# Patient Record
Sex: FEMALE | Race: BLACK OR AFRICAN AMERICAN | NOT HISPANIC OR LATINO | Employment: FULL TIME | URBAN - METROPOLITAN AREA
[De-identification: names, ages, dates, MRNs, and addresses within clinical notes are randomized per-mention and may not be internally consistent; named-entity substitution may affect disease eponyms.]

---

## 2024-02-25 ENCOUNTER — OFFICE VISIT (OUTPATIENT)
Dept: URGENT CARE | Facility: URGENT CARE | Age: 24
End: 2024-02-25
Payer: COMMERCIAL

## 2024-02-25 VITALS
SYSTOLIC BLOOD PRESSURE: 103 MMHG | DIASTOLIC BLOOD PRESSURE: 73 MMHG | HEART RATE: 92 BPM | TEMPERATURE: 97.9 F | RESPIRATION RATE: 10 BRPM | WEIGHT: 139.2 LBS | OXYGEN SATURATION: 97 %

## 2024-02-25 DIAGNOSIS — R07.0 THROAT PAIN: Primary | ICD-10-CM

## 2024-02-25 LAB
DEPRECATED S PYO AG THROAT QL EIA: NEGATIVE
GROUP A STREP BY PCR: NOT DETECTED

## 2024-02-25 PROCEDURE — 99203 OFFICE O/P NEW LOW 30 MIN: CPT | Performed by: FAMILY MEDICINE

## 2024-02-25 PROCEDURE — 87651 STREP A DNA AMP PROBE: CPT | Performed by: FAMILY MEDICINE

## 2024-02-25 RX ORDER — PREDNISONE 20 MG/1
40 TABLET ORAL ONCE
Qty: 2 TABLET | Refills: 0 | Status: SHIPPED | OUTPATIENT
Start: 2024-02-25 | End: 2024-02-25

## 2024-02-25 NOTE — PROGRESS NOTES
SUBJECTIVE: 23 year old female with:  Chief Complaint   Patient presents with    Urgent Care    Throat Problem     Throat pain x's 3 days    ST  Duration: 3 days.  Severity: mild/moderate  Modifiers: OTC meds not helpful  Trend of symptoms: worsening  Other symptoms:    No Nausea/vomiting. No rash.  HEENT, CV, Resp, GI, Skin, review of symptoms except as noted above is otherwise negative  Allergies: Patient has no known allergies.  I have reviewed the patient's medical history in detail; there are no changes to the history as noted in [employer].    OBJECTIVE:  /73 (BP Location: Right arm, Patient Position: Sitting, Cuff Size: Adult Regular)   Pulse 92   Temp 97.9  F (36.6  C) (Oral)   Resp 10   Wt 63.1 kg (139 lb 3.2 oz)   LMP  (LMP Unknown)   SpO2 97%   Appears mild distress.  Eyes: no conjunctivitis  Ears: normal bilateral TM's and external ear canals, normal  Oropharynx: erythema, palate petechia  Neck: no adenopathy  Skin: no rash  Lungs: chest clear, no wheezing, rales, normal symmetric air entry, clear to IPPA  Rapid Strep test is:neg      ASSESSMENT/PLAN:    ICD-10-CM    1. Throat pain  R07.0 Streptococcus A Rapid Screen w/Reflex to PCR - Clinic Collect     Group A Streptococcus PCR Throat Swab     predniSONE (DELTASONE) 20 MG tablet        Patient Instructions     Fill and take prednisone if you get a to a point where you can't swallow    Else it is best to wait because it migt make the whole thing last a bit longer    We will call if the final strep test is positive      Gerald Brito MD [facility]

## 2024-02-25 NOTE — PATIENT INSTRUCTIONS
Fill and take prednisone if you get a to a point where you can't swallow    Else it is best to wait because it migt make the whole thing last a bit longer    We will call if the final strep test is positive

## 2024-02-27 ENCOUNTER — HOSPITAL ENCOUNTER (EMERGENCY)
Facility: CLINIC | Age: 24
Discharge: HOME | End: 2024-02-28
Attending: EMERGENCY MEDICINE | Admitting: EMERGENCY MEDICINE
Payer: COMMERCIAL

## 2024-02-27 ENCOUNTER — APPOINTMENT (OUTPATIENT)
Dept: CT IMAGING | Facility: CLINIC | Age: 24
End: 2024-02-27
Attending: EMERGENCY MEDICINE
Payer: COMMERCIAL

## 2024-02-27 VITALS
RESPIRATION RATE: 18 BRPM | SYSTOLIC BLOOD PRESSURE: 115 MMHG | DIASTOLIC BLOOD PRESSURE: 82 MMHG | HEART RATE: 96 BPM | TEMPERATURE: 99.5 F | OXYGEN SATURATION: 99 %

## 2024-02-27 DIAGNOSIS — J10.1 INFLUENZA B: ICD-10-CM

## 2024-02-27 DIAGNOSIS — J02.0 STREP PHARYNGITIS: ICD-10-CM

## 2024-02-27 LAB
ALBUMIN SERPL BCG-MCNC: 4.1 G/DL (ref 3.5–5.2)
ALP SERPL-CCNC: 68 U/L (ref 40–150)
ALT SERPL W P-5'-P-CCNC: <5 U/L (ref 0–50)
ANION GAP SERPL CALCULATED.3IONS-SCNC: 11 MMOL/L (ref 7–15)
AST SERPL W P-5'-P-CCNC: 19 U/L (ref 0–45)
BASOPHILS # BLD AUTO: 0 10E3/UL (ref 0–0.2)
BASOPHILS NFR BLD AUTO: 0 %
BILIRUB SERPL-MCNC: 0.6 MG/DL
BUN SERPL-MCNC: 6.9 MG/DL (ref 6–20)
CALCIUM SERPL-MCNC: 9 MG/DL (ref 8.6–10)
CHLORIDE SERPL-SCNC: 100 MMOL/L (ref 98–107)
CREAT SERPL-MCNC: 0.76 MG/DL (ref 0.51–0.95)
CRP SERPL-MCNC: 56.9 MG/L
DEPRECATED HCO3 PLAS-SCNC: 22 MMOL/L (ref 22–29)
EGFRCR SERPLBLD CKD-EPI 2021: >90 ML/MIN/1.73M2
EOSINOPHIL # BLD AUTO: 0 10E3/UL (ref 0–0.7)
EOSINOPHIL NFR BLD AUTO: 0 %
ERYTHROCYTE [DISTWIDTH] IN BLOOD BY AUTOMATED COUNT: 16.4 % (ref 10–15)
FLUAV RNA SPEC QL NAA+PROBE: NEGATIVE
FLUBV RNA RESP QL NAA+PROBE: POSITIVE
GLUCOSE SERPL-MCNC: 89 MG/DL (ref 70–99)
GROUP A STREP BY PCR: DETECTED
HCG UR QL: NEGATIVE
HCT VFR BLD AUTO: 33 % (ref 35–47)
HGB BLD-MCNC: 10.3 G/DL (ref 11.7–15.7)
IMM GRANULOCYTES # BLD: 0.1 10E3/UL
IMM GRANULOCYTES NFR BLD: 1 %
INTERNAL QC OK POCT: NORMAL
LYMPHOCYTES # BLD AUTO: 1 10E3/UL (ref 0.8–5.3)
LYMPHOCYTES NFR BLD AUTO: 11 %
MCH RBC QN AUTO: 23.1 PG (ref 26.5–33)
MCHC RBC AUTO-ENTMCNC: 31.2 G/DL (ref 31.5–36.5)
MCV RBC AUTO: 74 FL (ref 78–100)
MONOCYTES # BLD AUTO: 1.1 10E3/UL (ref 0–1.3)
MONOCYTES NFR BLD AUTO: 12 %
NEUTROPHILS # BLD AUTO: 6.8 10E3/UL (ref 1.6–8.3)
NEUTROPHILS NFR BLD AUTO: 76 %
NRBC # BLD AUTO: 0 10E3/UL
NRBC BLD AUTO-RTO: 0 /100
PLATELET # BLD AUTO: 302 10E3/UL (ref 150–450)
POCT KIT EXPIRATION DATE: NORMAL
POCT KIT LOT NUMBER: NORMAL
POTASSIUM SERPL-SCNC: 3.7 MMOL/L (ref 3.4–5.3)
PROT SERPL-MCNC: 8.1 G/DL (ref 6.4–8.3)
RBC # BLD AUTO: 4.46 10E6/UL (ref 3.8–5.2)
RSV RNA SPEC NAA+PROBE: NEGATIVE
SARS-COV-2 RNA RESP QL NAA+PROBE: NEGATIVE
SODIUM SERPL-SCNC: 133 MMOL/L (ref 135–145)
WBC # BLD AUTO: 8.9 10E3/UL (ref 4–11)

## 2024-02-27 PROCEDURE — 86140 C-REACTIVE PROTEIN: CPT | Performed by: EMERGENCY MEDICINE

## 2024-02-27 PROCEDURE — 81025 URINE PREGNANCY TEST: CPT | Performed by: EMERGENCY MEDICINE

## 2024-02-27 PROCEDURE — 99285 EMERGENCY DEPT VISIT HI MDM: CPT | Mod: 25 | Performed by: EMERGENCY MEDICINE

## 2024-02-27 PROCEDURE — 87651 STREP A DNA AMP PROBE: CPT | Performed by: EMERGENCY MEDICINE

## 2024-02-27 PROCEDURE — 70491 CT SOFT TISSUE NECK W/DYE: CPT | Mod: 26 | Performed by: RADIOLOGY

## 2024-02-27 PROCEDURE — 70491 CT SOFT TISSUE NECK W/DYE: CPT

## 2024-02-27 PROCEDURE — 250N000013 HC RX MED GY IP 250 OP 250 PS 637: Performed by: EMERGENCY MEDICINE

## 2024-02-27 PROCEDURE — 99284 EMERGENCY DEPT VISIT MOD MDM: CPT | Performed by: EMERGENCY MEDICINE

## 2024-02-27 PROCEDURE — 258N000003 HC RX IP 258 OP 636: Performed by: EMERGENCY MEDICINE

## 2024-02-27 PROCEDURE — 85025 COMPLETE CBC W/AUTO DIFF WBC: CPT | Performed by: EMERGENCY MEDICINE

## 2024-02-27 PROCEDURE — 96361 HYDRATE IV INFUSION ADD-ON: CPT | Performed by: EMERGENCY MEDICINE

## 2024-02-27 PROCEDURE — 87637 SARSCOV2&INF A&B&RSV AMP PRB: CPT | Performed by: EMERGENCY MEDICINE

## 2024-02-27 PROCEDURE — 80053 COMPREHEN METABOLIC PANEL: CPT | Performed by: EMERGENCY MEDICINE

## 2024-02-27 PROCEDURE — 36415 COLL VENOUS BLD VENIPUNCTURE: CPT | Performed by: EMERGENCY MEDICINE

## 2024-02-27 RX ORDER — IBUPROFEN 600 MG/1
600 TABLET, FILM COATED ORAL ONCE
Status: COMPLETED | OUTPATIENT
Start: 2024-02-27 | End: 2024-02-27

## 2024-02-27 RX ORDER — PENICILLIN V POTASSIUM 500 MG/1
500 TABLET, FILM COATED ORAL ONCE
Status: COMPLETED | OUTPATIENT
Start: 2024-02-27 | End: 2024-02-28

## 2024-02-27 RX ORDER — IOPAMIDOL 755 MG/ML
90 INJECTION, SOLUTION INTRAVASCULAR ONCE
Status: COMPLETED | OUTPATIENT
Start: 2024-02-27 | End: 2024-02-28

## 2024-02-27 RX ADMIN — IBUPROFEN 600 MG: 600 TABLET, FILM COATED ORAL at 21:53

## 2024-02-27 RX ADMIN — SODIUM CHLORIDE 1000 ML: 9 INJECTION, SOLUTION INTRAVENOUS at 21:54

## 2024-02-27 ASSESSMENT — COLUMBIA-SUICIDE SEVERITY RATING SCALE - C-SSRS
6. HAVE YOU EVER DONE ANYTHING, STARTED TO DO ANYTHING, OR PREPARED TO DO ANYTHING TO END YOUR LIFE?: NO
1. IN THE PAST MONTH, HAVE YOU WISHED YOU WERE DEAD OR WISHED YOU COULD GO TO SLEEP AND NOT WAKE UP?: NO
2. HAVE YOU ACTUALLY HAD ANY THOUGHTS OF KILLING YOURSELF IN THE PAST MONTH?: NO

## 2024-02-27 ASSESSMENT — ACTIVITIES OF DAILY LIVING (ADL)
ADLS_ACUITY_SCORE: 35
ADLS_ACUITY_SCORE: 35

## 2024-02-28 PROCEDURE — 99207 PR NO CHARGE LOS: CPT | Performed by: OTOLARYNGOLOGY

## 2024-02-28 PROCEDURE — 250N000013 HC RX MED GY IP 250 OP 250 PS 637: Performed by: EMERGENCY MEDICINE

## 2024-02-28 PROCEDURE — 250N000011 HC RX IP 250 OP 636: Performed by: EMERGENCY MEDICINE

## 2024-02-28 PROCEDURE — 42700 I&D ABSCESS PERITONSILLAR: CPT

## 2024-02-28 PROCEDURE — 87070 CULTURE OTHR SPECIMN AEROBIC: CPT

## 2024-02-28 PROCEDURE — 250N000009 HC RX 250: Performed by: EMERGENCY MEDICINE

## 2024-02-28 PROCEDURE — 96374 THER/PROPH/DIAG INJ IV PUSH: CPT | Mod: 59 | Performed by: EMERGENCY MEDICINE

## 2024-02-28 RX ORDER — LIDOCAINE HYDROCHLORIDE 20 MG/ML
10 SOLUTION OROPHARYNGEAL ONCE
Status: DISCONTINUED | OUTPATIENT
Start: 2024-02-28 | End: 2024-02-28 | Stop reason: HOSPADM

## 2024-02-28 RX ORDER — ACETAMINOPHEN 500 MG
1000 TABLET ORAL ONCE
Status: COMPLETED | OUTPATIENT
Start: 2024-02-28 | End: 2024-02-28

## 2024-02-28 RX ORDER — LIDOCAINE HYDROCHLORIDE AND EPINEPHRINE 10; 10 MG/ML; UG/ML
INJECTION, SOLUTION INFILTRATION; PERINEURAL
Status: DISCONTINUED
Start: 2024-02-28 | End: 2024-02-28 | Stop reason: HOSPADM

## 2024-02-28 RX ORDER — PENICILLIN V POTASSIUM 500 MG/1
500 TABLET, FILM COATED ORAL 2 TIMES DAILY
Qty: 20 TABLET | Refills: 0 | Status: SHIPPED | OUTPATIENT
Start: 2024-02-28 | End: 2024-02-28

## 2024-02-28 RX ORDER — DEXAMETHASONE SODIUM PHOSPHATE 10 MG/ML
10 INJECTION, SOLUTION INTRAMUSCULAR; INTRAVENOUS ONCE
Status: COMPLETED | OUTPATIENT
Start: 2024-02-28 | End: 2024-02-28

## 2024-02-28 RX ADMIN — PENICILLIN V POTASSIUM 500 MG: 500 TABLET, FILM COATED ORAL at 00:50

## 2024-02-28 RX ADMIN — ACETAMINOPHEN 1000 MG: 500 TABLET ORAL at 04:40

## 2024-02-28 RX ADMIN — SODIUM CHLORIDE, PRESERVATIVE FREE 60 ML: 5 INJECTION INTRAVENOUS at 00:04

## 2024-02-28 RX ADMIN — DEXAMETHASONE SODIUM PHOSPHATE 10 MG: 10 INJECTION INTRAMUSCULAR; INTRAVENOUS at 04:44

## 2024-02-28 RX ADMIN — IOPAMIDOL 90 ML: 755 INJECTION, SOLUTION INTRAVENOUS at 00:05

## 2024-02-28 ASSESSMENT — ACTIVITIES OF DAILY LIVING (ADL)
ADLS_ACUITY_SCORE: 35

## 2024-02-28 NOTE — DISCHARGE INSTRUCTIONS
Warm salt water gargles can help with sore throat.  Take the antibiotics as prescribed until completion.  Even if your symptoms resolve, continue taking.  You received your first dose in the emergency department today.  Return to the emergency department if you are having difficulty breathing, inability to swallow small sips of fluid, increasing pain or fevers, or if you have concerns.    Your bloodwork also showed some anemia (low blood counts).  You can discuss with your primary care doctor starting iron supplements.

## 2024-02-28 NOTE — ED TRIAGE NOTES
Presents with sore throat since Saturday.   Strep negative on Sunday.   Prednisone taper not helping.   Left sided tonsil pushing uvula over.      Triage Assessment (Adult)       Row Name 02/27/24 8784          Triage Assessment    Airway WDL X  left sided tonsil pushing uvula over        Respiratory WDL    Respiratory WDL WDL        Skin Circulation/Temperature WDL    Skin Circulation/Temperature WDL WDL

## 2024-02-28 NOTE — CONSULTS
Otolaryngology Consult Note  February 28, 2024      CC: Tonsillar abscess    HPI: Yelena Earl is a 23 year old female without contributory past medical history who presents with a few days of left greater than right throat pain and increasing difficulty to tolerate p.o.  She feels like her voice is changed slightly.  She has not had any breathing symptoms.  She has felt feverish at home.  She went to urgent care on the 25th where a strep swab was negative.  She was prescribed prednisone at that time.  Today she is positive for strep and flu.  No major past medical or surgical history.  She endorses heavy menses but has never needed iron supplementation or blood transfusion.  She has never had an abscess in her mouth that required draining in the past.  She has not had any antibiotics before today.    History reviewed. No pertinent past medical history.    History reviewed. No pertinent surgical history.    Current Outpatient Medications   Medication Sig Dispense Refill    penicillin V (VEETID) 500 MG tablet Take 1 tablet (500 mg) by mouth 2 times daily for 10 days 20 tablet 0        No Known Allergies    Social History     Socioeconomic History    Marital status: Single     Spouse name: Not on file    Number of children: Not on file    Years of education: Not on file    Highest education level: Not on file   Occupational History    Not on file   Tobacco Use    Smoking status: Never    Smokeless tobacco: Never   Substance and Sexual Activity    Alcohol use: Not on file    Drug use: Not on file    Sexual activity: Not on file   Other Topics Concern    Not on file   Social History Narrative    Not on file     Social Determinants of Health     Financial Resource Strain: Not on file   Food Insecurity: Not on file   Transportation Needs: Not on file   Physical Activity: Not on file   Stress: Not on file   Social Connections: Not on file   Interpersonal Safety: Not on file   Housing Stability: Not on file       History  reviewed. No pertinent family history.    ROS: 12 point review of systems is negative unless noted in HPI.    PHYSICAL EXAM:  /82   Pulse 96   Temp 99.5  F (37.5  C)   Resp 18   LMP 02/27/2024   SpO2 99%   General: laying in bed, no acute distress  HEAD: normocephalic, atraumatic  Face: symmetrical, CN VII intact bilaterally (HB 1), no swelling, edema, or erythema  Eyes: clear sclera  Ears: External ears normal  Nose: no anterior drainage   Mouth: No trismus, moist, tongue piercing present  Oropharynx: Tonsillar erythema, swelling of the tonsils left greater than right with mild effacement of the left anterior pillar  Neck: no LAD, trachea midline  Neuro: cranial nerves 2-12 grossly intact  Respiratory: breathing non-labored on RA, no stridor  Skin: no rashes or skin lesions of the face/neck  Psych: pleasant affect  Cardio: extremities warm and well perfused     Incision and drainage: Due to tonsillar abscess, incision and drainage was indicated.  After obtaining verbal consent the oropharynx was anesthetized with benzocaine, lidocaine.  An 18-gauge needle was inserted into the area of the abscess and purulence was aspirated and sent for culture.  Next an 11 blade was used to make an incision along the anterior pillar.  A curved Renken was used to bluntly dissect into the abscess pocket.  A few cc of purulence were expressed.  The patient tolerated the procedure very well.      ROUTINE IP LABS (Last four results)  BMP  Recent Labs   Lab 02/27/24  2148   *   POTASSIUM 3.7   CHLORIDE 100   BARON 9.0   CO2 22   BUN 6.9   CR 0.76   GLC 89     CBC  Recent Labs   Lab 02/27/24  2148   WBC 8.9   RBC 4.46   HGB 10.3*   HCT 33.0*   MCV 74*   MCH 23.1*   MCHC 31.2*   RDW 16.4*        INRNo lab results found in last 7 days.    Imaging:  Results for orders placed or performed during the hospital encounter of 02/27/24   Soft tissue neck CT w contrast    Narrative    EXAM: CT SOFT TISSUE NECK W  CONTRAST  LOCATION: [facility]  DATE: 2/28/2024    INDICATION: difficulty rotating neck, pharyngitis, Left tonsil > right tonsil  COMPARISON: None.  CONTRAST: iopamidol (ISOVUE 370) solution 90 mL  TECHNIQUE: Routine CT Soft Tissue Neck with IV contrast. Multiplanar reformats. Dose reduction techniques were used.    FINDINGS:     MUCOSAL SPACES/SOFT TISSUES: There is prominence of the left tonsillar pillar with an early abscess formation with rim enhancement. This measures approximately 1.7 cm anteriorly posteriorly by 1.8 cm transversely by 2.2 cm craniocaudally. There is also   prominence of the lingual tonsils but no discrete abscess formation within. The rest of the mucosal surfaces of the upper aerodigestive tract are within normal limits. Normal vocal cords and infraglottic trachea. There is inflammatory stranding noted   within the left parapharyngeal fat space. Normal oral cavity,  spaces, and floor of mouth structures.    LYMPH NODES: There are scattered lymph nodes seen throughout the neck region especially at the level 1 through level 3 juan a stations. The largest is a left jugulodigastric lymph node that measures approximately 1.5 mm x 1.4 mm.     SALIVARY GLANDS: Normal parotid and submandibular glands.    THYROID: Normal.     VESSELS: Vascular structures of the neck are patent.    VISUALIZED INTRACRANIAL/ORBITS/SINUSES: No abnormality of the visualized intracranial compartment or orbits. Mucous retention cyst and mucosal thickening left maxillary sinus.    OTHER: No destructive osseous lesion. The included lung apices are clear. The patient has a tongue and nasal piercing.      Impression    IMPRESSION:   1.  Prominence of lingual tonsils and left tonsillar pillar with abscess within the left tonsillar pillar.  2.  Reactive adenopathy.         Assessment and Plan  Yelena Earl is a 23 year old female without contributory past medical history who  presents with a left-sided tonsillar abscess in the setting of group A strep and influenza B now status post incision and drainage.     -She is counseled to return to the ED if she has persistent bleeding, recurrence of the symptoms that brought her here  -Augmentin for 10 days  -Follow-up with ENT if this recurs a second time    Patient and plan to be discussed with staff surgeon Dr. Kate Lerner MD  Otolaryngology-Head & Neck Surgery, PGY2  Please page ENT with questions by dialing [phone] and entering job code [phone] when prompted.    I, Kate Quiroz MD, discussed this patient with the resident/fellow at the time of consultation. I have reviewed the note, labs, imaging and am in agreement with the assessment and plan. I did not see the patient.

## 2024-02-28 NOTE — ED PROVIDER NOTES
[place] EMERGENCY DEPARTMENT ([facility])    2/27/24       ED PROVIDER NOTE      History     Chief Complaint   Patient presents with    Pharyngitis     The history is provided by the patient and medical records.     Yelena Earl is a 23 year old female who presents to the ED for evaluation of ongoing throat pain.  Patient states that she initially developed this pain last Saturday and had tested negative for strep throat on Sunday.  However, she is continuing to experience pain and has since developed a cough productive for phlegm as of today.  Patient is able to range of motion her neck, however states difficulty due to pain.  She thinks that there are voice changes.  She was able to have some soup today, but very little fluids in addition to this.  Notes that she has been taking 1 g Tylenol, her last dose was 2 hours ago.  She denies additional symptoms.  No nausea or vomiting.  Denies fevers or chills, chest pain, shortness of breath.  She denies any drooling, nausea, vomiting        Past Medical History  History reviewed. No pertinent past medical history.  History reviewed. No pertinent surgical history.  No current outpatient medications on file.    No Known Allergies  Family History  History reviewed. No pertinent family history.  Social History   Social History     Tobacco Use    Smoking status: Never    Smokeless tobacco: Never      Past medical history, past surgical history, medications, allergies, family history, and social history were reviewed with the patient. No additional pertinent items.      A medically appropriate review of systems was performed with pertinent positives and negatives noted in the HPI, and all other systems negative.    Physical Exam   BP: 115/82  Pulse: 96  Temp: 99.5  F (37.5  C)  Resp: 18  SpO2: 99 %  Physical Exam  General: no acute distress.  No stridor, trismus, sniffing position, drooling  HENT: Normocephalic and atraumatic. Trachea midline.  Tender anterior  cervical lymphadenopathy.  Bilateral tonsillar edema L>R with mild uvula deviation.  Slight hot potato voice.  No woodiness, crepitus, or difficulty with ROM.  Eyes: EOMI, conjunctivae normal.    Cardiovascular:  Normal rate and regular rhythm.   No murmur heard.  Radial pulses 2+ bilaterally.  Pulmonary:  No respiratory distress. Normal breath sounds bilaterally.  Abdominal: no distension.  Abdomen is soft. There is no mass. There is no abdominal tenderness.  Musculoskeletal:    Moving all extremities spontaneously.    Skin: Warm, dry, and well perfused.   Neurological:  No focal deficit present.    Psychiatric:   The patient is awake, alert.  Appropriate mood and affect.    ED Course, Procedures, & Data    9:34 PM  The patient was seen and examined by Amita Ceja   in Room VTA.     Procedures               No results found for any visits on 02/27/24.  Medications - No data to display  Labs Ordered and Resulted from Time of ED Arrival to Time of ED Departure - No data to display  No orders to display          Critical care was not performed.     Medical Decision Making  The patient's presentation was of high complexity (an acute health issue posing potential threat to life or bodily function).  The patient's evaluation involved:  review of external note(s) from 3+ sources (see separate area of note for details)  review of 3+ test result(s) ordered prior to this encounter (see separate area of note for details)  ordering and/or review of 3+ test(s) in this encounter (see separate area of note for details)  independent interpretation of testing performed by another health professional (see separate area of note for details)     The patient's management necessitated further care after sign-out to Dr. Larkin    Assessment & Plan    Patient arrives emergency department for chief complaint of pharyngitis, cough, decreased p.o. intake.  Physical exam is reassuring.  She is not tachycardic or febrile.  There is no  concern for acute airway compromise.  She has slightly muffled voice, otherwise tolerating her secretions.  Lungs are clear to auscultation bilaterally    Provided 1 L normal saline, 600 mg of ibuprofen.  Influenza B and strep PCR positive.  She is mildly hyponatremic 133.  CRP is 56.9.  There is no leukocytosis.  CBC reveals microcytic anemia with a hemoglobin of 10.3.  Patient given first dose of penicillin in the ED.  CT soft tissue to evaluate for PTA, retroperitoneal abscess, deep space infection, phlegmon.  Patient care signed out to oncoming physician to follow up on CT soft tissue neck.  Likely discharge on penicillin, and patient can follow up with primary care doctor to discuss iron supplementation.    I have reviewed the nursing notes. I have reviewed the findings, diagnosis, plan and need for follow up with the patient.    New Prescriptions    No medications on file       Final diagnoses:   None     [facility] EMERGENCY DEPARTMENT  2/27/2024         Amita Ceja MD  02/28/24 0142

## 2024-02-28 NOTE — ED PROVIDER NOTES
Sign Out Provider: Dr. Ceja    Sign Out Plan: 23-year-old female here for evaluation of worsening throat pain.  Patient tested positive for influenza B, positive for strep.  Patient was treated with dose of ibuprofen, penicillin and is ending CT scan to further evaluate and rule out abscess at the time of signout.    Reassessment: I personally reviewed and interpreted CT scan of the neck which demonstrates left tonsillar pillar with early abscess formation with rim enhancement 1.7 cm x 1.8 cm x 2.3 cm.  Discussed patient manage with ENT who evaluated patient once per night attempted bedside drainage which was unsuccessful.  Recommend continue supportive care, 10-day course of Augmentin, follow-up with ENT if worsening of symptoms.  Patient understands and agrees with plan.    Disposition: Discharge       Danielle Larkin MD  02/28/24 0619

## 2024-03-01 LAB
BACTERIA ABSC ANAEROBE+AEROBE CULT: ABNORMAL
GRAM STAIN RESULT: ABNORMAL

## 2024-04-11 ENCOUNTER — OFFICE VISIT (OUTPATIENT)
Dept: URGENT CARE | Facility: URGENT CARE | Age: 24
End: 2024-04-11
Payer: COMMERCIAL

## 2024-04-11 VITALS
OXYGEN SATURATION: 99 % | TEMPERATURE: 99.7 F | DIASTOLIC BLOOD PRESSURE: 75 MMHG | HEART RATE: 98 BPM | WEIGHT: 137 LBS | SYSTOLIC BLOOD PRESSURE: 114 MMHG | RESPIRATION RATE: 28 BRPM

## 2024-04-11 DIAGNOSIS — J02.0 STREP THROAT: Primary | ICD-10-CM

## 2024-04-11 DIAGNOSIS — J03.90 TONSILLITIS: ICD-10-CM

## 2024-04-11 DIAGNOSIS — J02.9 SORE THROAT: ICD-10-CM

## 2024-04-11 LAB — DEPRECATED S PYO AG THROAT QL EIA: POSITIVE

## 2024-04-11 PROCEDURE — 96372 THER/PROPH/DIAG INJ SC/IM: CPT | Performed by: PHYSICIAN ASSISTANT

## 2024-04-11 PROCEDURE — 99214 OFFICE O/P EST MOD 30 MIN: CPT | Mod: 25 | Performed by: PHYSICIAN ASSISTANT

## 2024-04-11 PROCEDURE — 87880 STREP A ASSAY W/OPTIC: CPT | Performed by: PHYSICIAN ASSISTANT

## 2024-04-11 RX ORDER — DEXAMETHASONE SODIUM PHOSPHATE 4 MG/ML
10 VIAL (ML) INJECTION ONCE
Status: COMPLETED | OUTPATIENT
Start: 2024-04-11 | End: 2024-04-11

## 2024-04-11 RX ORDER — CEFTRIAXONE SODIUM 1 G
1 VIAL (EA) INJECTION ONCE
Status: COMPLETED | OUTPATIENT
Start: 2024-04-11 | End: 2024-04-11

## 2024-04-11 RX ORDER — AMOXICILLIN AND CLAVULANATE POTASSIUM 400; 57 MG/5ML; MG/5ML
875 POWDER, FOR SUSPENSION ORAL 2 TIMES DAILY
Qty: 218.8 ML | Refills: 0 | Status: SHIPPED | OUTPATIENT
Start: 2024-04-11 | End: 2024-04-21

## 2024-04-11 RX ADMIN — Medication 1 G: at 20:06

## 2024-04-11 RX ADMIN — Medication 10 MG: at 20:05

## 2024-04-11 ASSESSMENT — PAIN SCALES - GENERAL: PAINLEVEL: EXTREME PAIN (9)

## 2024-04-11 ASSESSMENT — ENCOUNTER SYMPTOMS
WOUND: 0
NECK PAIN: 0
ENDOCRINE NEGATIVE: 1
SHORTNESS OF BREATH: 0
PALPITATIONS: 0
SORE THROAT: 1
VOMITING: 0
TROUBLE SWALLOWING: 1
NECK STIFFNESS: 0
ARTHRALGIAS: 0
DIZZINESS: 0
WEAKNESS: 0
HEADACHES: 0
JOINT SWELLING: 0
DIARRHEA: 0
COUGH: 0
LIGHT-HEADEDNESS: 0
MUSCULOSKELETAL NEGATIVE: 1
RHINORRHEA: 0
NAUSEA: 0
CHILLS: 0
FEVER: 0
EYES NEGATIVE: 1
ALLERGIC/IMMUNOLOGIC NEGATIVE: 1
BACK PAIN: 0
MYALGIAS: 0
CARDIOVASCULAR NEGATIVE: 1
RESPIRATORY NEGATIVE: 1

## 2024-04-11 NOTE — LETTER
April 11, 2024      Yelena Earl  [address] [place] [address]        To Whom It May Concern:    Yelena Earl  was seen on 4/11/2024.  Please excuse her  until fever free due to illness.        Sincerely,        Jaden Torres PA-C

## 2024-04-12 NOTE — PROGRESS NOTES
Chief Complaint:     Chief Complaint   Patient presents with    Pharyngitis     X two day       Results for orders placed or performed in visit on 04/11/24   Streptococcus A Rapid Screen w/Reflex to PCR - Clinic Collect     Status: Abnormal    Specimen: Throat; Swab   Result Value Ref Range    Group A Strep antigen Positive (A) Negative       Medical Decision Making:    Vital signs reviewed by Jaden Torres PA-C  /75 (BP Location: Left arm, Patient Position: Sitting, Cuff Size: Adult Regular)   Pulse 98   Temp 99.7  F (37.6  C) (Oral)   Resp 28   Wt 62.1 kg (137 lb)   LMP 02/27/2024   SpO2 99%     Differential Diagnosis:  URI Adult/Peds:  Strep pharyngitis, Tonsilitis, Viral pharyngitis, Viral syndrome, and peritonsillar abscess        ASSESSMENT    1. Strep throat    2. Tonsillitis    3. Sore throat        PLAN    Patient is in acute distress.  She has severe pain with swallowing but can manage her own secretions at this time.  Temp is 99.7 in clinic today, lung sounds were clear, and O2 sats at 99% on RA.    RST was positive.  Rx for Augmentin liquid sent in.  Patient given 1G Rocephin IM in clinic today.  Patient given 10 Mg Decadron PO in clinic today.  Rest, Push fluids.  Ibuprofen and or Tylenol for any fever or body aches.  If symptoms worsen, recheck immediately otherwise follow up with your PCP in 1 week if symptoms are not improving.  Worrisome symptoms discussed with instructions to go to the ED.  Patient verbalized understanding and agreed with this plan.    33 minutes was spent in the care of this patient including chart review, HPI, ROS, PE, review of plan, and placing of orders.      Labs:    Results for orders placed or performed in visit on 04/11/24   Streptococcus A Rapid Screen w/Reflex to PCR - Clinic Collect     Status: Abnormal    Specimen: Throat; Swab   Result Value Ref Range    Group A Strep antigen Positive (A) Negative        Vital signs reviewed by Jaden Torres PA-C  BP  114/75 (BP Location: Left arm, Patient Position: Sitting, Cuff Size: Adult Regular)   Pulse 98   Temp 99.7  F (37.6  C) (Oral)   Resp 28   Wt 62.1 kg (137 lb)   LMP 02/27/2024   SpO2 99%     Current Meds      Current Outpatient Medications:     amoxicillin-clavulanate (AUGMENTIN) 400-57 MG/5ML suspension, Take 10.94 mLs (875 mg) by mouth 2 times daily for 10 days, Disp: 218.8 mL, Rfl: 0  No current facility-administered medications for this visit.      Respiratory History    occasional episodes of bronchitis      SUBJECTIVE    HPI: Yelena Earl is an 23 year old female who presents with sore throat and swollen glands and difficulty swallowing.  Symptoms began 2  days ago and has rapidly worsening.  Patient did have tonsillar abscess the end of February this year.  There is no shortness of breath, wheezing, and chest pain.  Patient is eating and drinking well.  No fever, nausea, vomiting, or diarrhea.  She is able to manage her own secretions.      Patient denies any recent travel or exposure to known COVID positive tested individual.      ROS:     Review of Systems   Constitutional:  Negative for chills and fever.   HENT:  Positive for sore throat and trouble swallowing. Negative for congestion, ear pain and rhinorrhea.    Eyes: Negative.    Respiratory: Negative.  Negative for cough and shortness of breath.    Cardiovascular: Negative.  Negative for chest pain and palpitations.   Gastrointestinal:  Negative for diarrhea, nausea and vomiting.   Endocrine: Negative.    Genitourinary: Negative.    Musculoskeletal: Negative.  Negative for arthralgias, back pain, joint swelling, myalgias, neck pain and neck stiffness.   Skin: Negative.  Negative for rash and wound.   Allergic/Immunologic: Negative.  Negative for immunocompromised state.   Neurological:  Negative for dizziness, weakness, light-headedness and headaches.         Family History   No family history on file.     Problem history  There is no problem  list on file for this patient.       Allergies  No Known Allergies     Social History  Social History     Socioeconomic History    Marital status: Single     Spouse name: Not on file    Number of children: Not on file    Years of education: Not on file    Highest education level: Not on file   Occupational History    Not on file   Tobacco Use    Smoking status: Never    Smokeless tobacco: Never   Vaping Use    Vaping status: Every Day   Substance and Sexual Activity    Alcohol use: Not on file    Drug use: Not on file    Sexual activity: Not on file   Other Topics Concern    Not on file   Social History Narrative    Not on file     Social Determinants of Health     Financial Resource Strain: Not on file   Food Insecurity: Not on file   Transportation Needs: Not on file   Physical Activity: Not on file   Stress: Not on file   Social Connections: Not on file   Interpersonal Safety: Not on file   Housing Stability: Not on file        OBJECTIVE     Vital signs reviewed by Jaden Torres PA-C  /75 (BP Location: Left arm, Patient Position: Sitting, Cuff Size: Adult Regular)   Pulse 98   Temp 99.7  F (37.6  C) (Oral)   Resp 28   Wt 62.1 kg (137 lb)   LMP 02/27/2024   SpO2 99%      Physical Exam  Vitals and nursing note reviewed.   Constitutional:       General: She is not in acute distress.     Appearance: She is well-developed. She is not ill-appearing, toxic-appearing or diaphoretic.   HENT:      Head: Normocephalic and atraumatic.      Right Ear: Hearing, tympanic membrane, ear canal and external ear normal. Tympanic membrane is not perforated, erythematous, retracted or bulging.      Left Ear: Hearing, tympanic membrane, ear canal and external ear normal. Tympanic membrane is not perforated, erythematous, retracted or bulging.      Nose: Congestion present. No mucosal edema or rhinorrhea.      Right Sinus: No maxillary sinus tenderness or frontal sinus tenderness.      Left Sinus: No maxillary sinus  tenderness or frontal sinus tenderness.      Mouth/Throat:      Pharynx: Posterior oropharyngeal erythema present. No pharyngeal swelling, oropharyngeal exudate or uvula swelling.      Tonsils: Tonsillar exudate present. No tonsillar abscesses. 2+ on the right. 3+ on the left.   Eyes:      General:         Right eye: No discharge.         Left eye: No discharge.      Pupils: Pupils are equal, round, and reactive to light.   Cardiovascular:      Rate and Rhythm: Normal rate and regular rhythm.      Heart sounds: Normal heart sounds. No murmur heard.     No friction rub. No gallop.   Pulmonary:      Effort: Pulmonary effort is normal. No respiratory distress.      Breath sounds: Normal breath sounds. No decreased breath sounds, wheezing, rhonchi or rales.   Chest:      Chest wall: No tenderness.   Abdominal:      General: Bowel sounds are normal. There is no distension.      Palpations: Abdomen is soft. There is no mass.      Tenderness: There is no abdominal tenderness. There is no guarding.   Musculoskeletal:      Cervical back: Normal range of motion and neck supple.   Lymphadenopathy:      Head:      Right side of head: No submental, submandibular, tonsillar, preauricular or posterior auricular adenopathy.      Left side of head: No submental, submandibular, tonsillar, preauricular or posterior auricular adenopathy.      Cervical: No cervical adenopathy.      Right cervical: No superficial or posterior cervical adenopathy.     Left cervical: No superficial or posterior cervical adenopathy.   Skin:     General: Skin is warm and dry.      Findings: No rash.   Neurological:      Mental Status: She is alert and oriented to person, place, and time.      Cranial Nerves: No cranial nerve deficit.      Deep Tendon Reflexes: Reflexes are normal and symmetric.   Psychiatric:         Behavior: Behavior normal. Behavior is cooperative.         Thought Content: Thought content normal.         Judgment: Judgment normal.            Jaden Torres PA-C  4/11/2024, 7:28 PM

## 2024-07-28 ENCOUNTER — HEALTH MAINTENANCE LETTER (OUTPATIENT)
Age: 24
End: 2024-07-28

## 2025-01-07 ENCOUNTER — TELEPHONE (OUTPATIENT)
Dept: OBGYN | Facility: CLINIC | Age: 25
End: 2025-01-07
Payer: COMMERCIAL

## 2025-01-07 DIAGNOSIS — Z32.01 PREGNANCY TEST POSITIVE: Primary | ICD-10-CM

## 2025-01-07 NOTE — TELEPHONE ENCOUNTER
[facility] Call Center    Phone Message    May a detailed message be left on voicemail: yes     Reason for Call: Other: Pt is pregnant and wanting to establish care but doesn't know LMP. Writer cannot schedule without LMP date. Please contact pt to help schedule     Action Taken: Message routed to:  Other: [facility]    Travel Screening: Not Applicable     Date of Service:

## 2025-01-07 NOTE — TELEPHONE ENCOUNTER
Called Isabella, who is completely unsure of her last menstrual period. Ordered CG x1 so we can see if she is in the discriminatory zone - if so, we could move on to scheduling first OB appts including US.

## 2025-01-08 ENCOUNTER — LAB (OUTPATIENT)
Dept: LAB | Facility: CLINIC | Age: 25
End: 2025-01-08
Payer: COMMERCIAL

## 2025-01-08 DIAGNOSIS — Z32.01 PREGNANCY TEST POSITIVE: ICD-10-CM

## 2025-01-08 LAB — HCG INTACT+B SERPL-ACNC: 7026 MIU/ML

## 2025-01-08 PROCEDURE — 36415 COLL VENOUS BLD VENIPUNCTURE: CPT

## 2025-01-08 PROCEDURE — 84702 CHORIONIC GONADOTROPIN TEST: CPT

## 2025-01-09 ENCOUNTER — TELEPHONE (OUTPATIENT)
Dept: OBGYN | Facility: CLINIC | Age: 25
End: 2025-01-09
Payer: COMMERCIAL